# Patient Record
Sex: MALE | Race: WHITE | URBAN - METROPOLITAN AREA
[De-identification: names, ages, dates, MRNs, and addresses within clinical notes are randomized per-mention and may not be internally consistent; named-entity substitution may affect disease eponyms.]

---

## 2017-01-24 ENCOUNTER — HISTORICAL (OUTPATIENT)
Dept: RADIOLOGY | Facility: HOSPITAL | Age: 72
End: 2017-01-24

## 2017-01-30 ENCOUNTER — HISTORICAL (OUTPATIENT)
Dept: LAB | Facility: HOSPITAL | Age: 72
End: 2017-01-30

## 2017-01-30 ENCOUNTER — HISTORICAL (OUTPATIENT)
Dept: PREADMISSION TESTING | Facility: HOSPITAL | Age: 72
End: 2017-01-30

## 2017-01-31 ENCOUNTER — HISTORICAL (OUTPATIENT)
Dept: SURGERY | Facility: HOSPITAL | Age: 72
End: 2017-01-31

## 2017-02-01 ENCOUNTER — HISTORICAL (OUTPATIENT)
Dept: LAB | Facility: HOSPITAL | Age: 72
End: 2017-02-01

## 2025-04-23 ENCOUNTER — HOSPITAL ENCOUNTER (OUTPATIENT)
Dept: RADIOLOGY | Facility: CLINIC | Age: 80
Discharge: HOME | End: 2025-04-23
Attending: REHABILITATION UNIT
Payer: MEDICARE

## 2025-04-23 ENCOUNTER — OFFICE VISIT (OUTPATIENT)
Dept: ORTHOPEDICS | Facility: CLINIC | Age: 80
End: 2025-04-23
Payer: MEDICARE

## 2025-04-23 VITALS
WEIGHT: 160.81 LBS | DIASTOLIC BLOOD PRESSURE: 65 MMHG | HEART RATE: 62 BPM | SYSTOLIC BLOOD PRESSURE: 119 MMHG | BODY MASS INDEX: 23.82 KG/M2 | HEIGHT: 69 IN

## 2025-04-23 DIAGNOSIS — M25.512 LEFT SHOULDER PAIN, UNSPECIFIED CHRONICITY: ICD-10-CM

## 2025-04-23 DIAGNOSIS — M25.512 LEFT SHOULDER PAIN, UNSPECIFIED CHRONICITY: Primary | ICD-10-CM

## 2025-04-23 PROCEDURE — 73030 X-RAY EXAM OF SHOULDER: CPT | Mod: LT,,, | Performed by: REHABILITATION UNIT

## 2025-04-23 RX ORDER — GLIMEPIRIDE 4 MG/1
4 TABLET ORAL
COMMUNITY

## 2025-04-23 RX ORDER — GABAPENTIN 300 MG/1
CAPSULE ORAL
COMMUNITY

## 2025-04-23 RX ORDER — ASPIRIN 81 MG/1
81 TABLET ORAL EVERY MORNING
COMMUNITY

## 2025-04-23 RX ORDER — ATORVASTATIN CALCIUM 40 MG/1
40 TABLET, FILM COATED ORAL
COMMUNITY
Start: 2025-02-27

## 2025-04-23 RX ORDER — EMPAGLIFLOZIN 25 MG/1
25 TABLET, FILM COATED ORAL
COMMUNITY
Start: 2025-01-25

## 2025-04-23 RX ORDER — ISOSORBIDE MONONITRATE 30 MG/1
30 TABLET, EXTENDED RELEASE ORAL
COMMUNITY

## 2025-04-23 RX ORDER — LIDOCAINE HYDROCHLORIDE 10 MG/ML
3 INJECTION, SOLUTION INFILTRATION; PERINEURAL
Status: DISCONTINUED | OUTPATIENT
Start: 2025-04-23 | End: 2025-04-23 | Stop reason: HOSPADM

## 2025-04-23 RX ORDER — APIXABAN 2.5 MG/1
2.5 TABLET, FILM COATED ORAL 2 TIMES DAILY
COMMUNITY
Start: 2025-03-29

## 2025-04-23 RX ORDER — LOSARTAN POTASSIUM 25 MG/1
25 TABLET ORAL EVERY MORNING
COMMUNITY

## 2025-04-23 RX ORDER — PANTOPRAZOLE SODIUM 40 MG/1
40 TABLET, DELAYED RELEASE ORAL
COMMUNITY

## 2025-04-23 RX ORDER — BETAMETHASONE SODIUM PHOSPHATE AND BETAMETHASONE ACETATE 3; 3 MG/ML; MG/ML
6 INJECTION, SUSPENSION INTRA-ARTICULAR; INTRALESIONAL; INTRAMUSCULAR; SOFT TISSUE
Status: DISCONTINUED | OUTPATIENT
Start: 2025-04-23 | End: 2025-04-23 | Stop reason: HOSPADM

## 2025-04-23 RX ORDER — AMLODIPINE BESYLATE 5 MG/1
5 TABLET ORAL
COMMUNITY

## 2025-04-23 RX ADMIN — LIDOCAINE HYDROCHLORIDE 3 ML: 10 INJECTION, SOLUTION INFILTRATION; PERINEURAL at 08:04

## 2025-04-23 RX ADMIN — BETAMETHASONE SODIUM PHOSPHATE AND BETAMETHASONE ACETATE 6 MG: 3; 3 INJECTION, SUSPENSION INTRA-ARTICULAR; INTRALESIONAL; INTRAMUSCULAR; SOFT TISSUE at 08:04

## 2025-04-23 NOTE — PROGRESS NOTES
Subjective:      Patient ID: Jose Mata is a 80 y.o. male.    Chief Complaint: Pain of the Left Shoulder (Ongoing pain for a month. Pain is constant in the shoulder. Has tried icing the shoulder which helped some of the pain. Denies any stiffness, loss of strengthen, numbness or tingling. )    HPI:   Jose Mata is a 80 y.o. male who presents today for initial evaluation of his left shoulder    History of Present Illness    CHIEF COMPLAINT:  - Left shoulder pain    HPI:  Jose presents with left shoulder pain that started about two months ago. Initially mild and intermittent, it has progressively worsened over the past month. The pain is constant, severe, and localized to the outside of the shoulder, interfering with sleep. He reports that the pain has been continuously increasing in intensity and is now affecting his sleep quality. He can move his shoulder but with constant pain.    This is his first medical visit for the shoulder issue. A family member examined the shoulder and noted some areas of concern, but no specific treatment was mentioned.    He has a history of R DR shan treated by Dr. Marcano at [facility], which is still causing problems. He denies any history of significant shoulder arthritis.    IMAGING:  - XR Left Shoulder: No significant bony abnormalities, arthritis in the acromioclavicular (AC) joint, no severe arthritis in the glenohumeral joint, good alignment of the ball and socket joint    SURGICAL HISTORY:  - Right distal radius fracture with dislocation: treated by Dr. Becerra      ROS:  Constitutional: +sleep disturbances, +nightime pain  Musculoskeletal: +limb pain, +pain with movement          Past Medical History:   Diagnosis Date    Cancer     prostate    Diabetes mellitus type I     Heart attack     2007     Past Surgical History:   Procedure Laterality Date    APPENDECTOMY      INSERTION OF PACEMAKER      2/7/23    PENILE PROSTHESIS IMPLANT       Social History[1]    Current  "Medications[2]  Review of patient's allergies indicates:  No Known Allergies    /65   Pulse 62   Ht 5' 9" (1.753 m)   Wt 72.9 kg (160 lb 12.8 oz)   BMI 23.75 kg/m²     Comprehensive review of systems completed and negative except as per HPI.        Objective:   Head: Normocephalic, without obvious abnormality, atraumatic  Eyes: conjunctivae/corneas clear. EOM's intact  Ears: normal external appearance  Nose: Nares normal. Septum midline. Mucosa normal. No drainage  Throat: normal findings: lips normal without lesions  Lungs: unlabored breathing on room air  Chest wall: symmetric chest rise  Heart: regular rate and rhythm  Pulses: 2+ and symmetric  Skin: Skin color, texture, turgor normal. No rashes or lesions  Neurologic: Grossly normal    left SHOULDER    Appearance:   normal    Cervical Spine:   Reduced motion     Tenderness:   Lateral shoulder     AROM:   , Abduction 160, ER 70, IR T10    PROM:  same    Pain:  AROM: Positive  PROM:  Negative  End ROM: Positive  Supraspinatus strength testing: Positive  External rotation strength testing: Negative  Teresita-scapular: Negative  Virtually all provacative maneuvers Negative    Strength:  Supraspinatus: intact  External rotation: intact  Teresita-scapular: intact    Provocative Maneuvers:     Rotator Cuff/Biceps/AC Joint  Neer's Sign: Positive  Hawkin's Test: Positive  Painful arc: Negative  Belly Press: Negative  Bear Hugger Test: Negative  Hornblower's Sign: Negative  Speed's Test: Positive  Yergeson's Test: Positive  Cross Arm Abduction: Positive    Pulses: Palpable radial pulse    Neurological deficits: None    The patient has a warm and well-perfused upper extremity with capillary refill less than 2 seconds. Sensation is intact to light touch in terminal nerve distributions. 5/5 ain/pin/uln. The patient has no palpable epitrochlear lymphadenopathy.      Assessment:     Imaging:   Four view radiographs of the left shoulder obtained today personally " reviewed showing no acute fractures or dislocations. AC OA.  Humeral head remains seated centrally within the glenoid.        1. Left shoulder pain, unspecified chronicity          Plan:       Orders Placed This Encounter    Large Joint Aspiration/Injection: L subacromial bursa    X-Ray Shoulder 2 or More Views Left        Imaging and exam findings discussed.     Assessment & Plan    REFERRALS:  - Referred to PT for left shoulder. [facility]     PROCEDURES:  - Right shoulder corticosteroid injection administered today.    FOLLOW UP:  - Follow up in 6-8 weeks if symptoms persist.       Avoid aggravating activities. Symptomatic management.     All questions were answered. Patient happy and in agreement with the plan.     This note was generated with the assistance of ambient listening technology. Verbal consent was obtained by the patient and accompanying visitor(s) for the recording of patient appointment to facilitate this note. I attest to having reviewed and edited the generated note for accuracy, though some syntax or spelling errors may persist. Please contact the author of this note for any clarification.              [1]   Social History  Socioeconomic History    Marital status:    Tobacco Use    Smoking status: Never    Smokeless tobacco: Never     Social Drivers of Health     Food Insecurity: No Food Insecurity (4/12/2024)    Received from [facility] and Its Subsidiaries and Affiliates    Hunger Vital Sign     Worried About Running Out of Food in the Last Year: Never true     Ran Out of Food in the Last Year: Never true   Transportation Needs: Unmet Transportation Needs (4/12/2024)    Received from [facility] and Its Subsidiaries and Affiliates    PRAPARE - Transportation     Lack of Transportation (Medical): Yes     Lack of Transportation (Non-Medical): Yes   Housing Stability: Low Risk  (4/12/2024)    [facility] from  [facility] and Its Subsidiaries and Affiliates    Housing Stability Vital Sign     Unable to Pay for Housing in the Last Year: No     Number of Places Lived in the Last Year: 1     Unstable Housing in the Last Year: No   [2]   Current Outpatient Medications:     amLODIPine (NORVASC) 5 MG tablet, Take 5 mg by mouth., Disp: , Rfl:     aspirin (ECOTRIN) 81 MG EC tablet, Take 81 mg by mouth every morning., Disp: , Rfl:     atorvastatin (LIPITOR) 40 MG tablet, Take 40 mg by mouth., Disp: , Rfl:     ELIQUIS 2.5 mg Tab, Take 2.5 mg by mouth 2 (two) times daily., Disp: , Rfl:     gabapentin (NEURONTIN) 300 MG capsule, Take by mouth., Disp: , Rfl:     glimepiride (AMARYL) 4 MG tablet, Take 4 mg by mouth., Disp: , Rfl:     isosorbide mononitrate (IMDUR) 30 MG 24 hr tablet, Take 30 mg by mouth., Disp: , Rfl:     JARDIANCE 25 mg tablet, Take 25 mg by mouth., Disp: , Rfl:     losartan (COZAAR) 25 MG tablet, Take 25 mg by mouth every morning., Disp: , Rfl:     pantoprazole (PROTONIX) 40 MG tablet, Take 40 mg by mouth., Disp: , Rfl:

## 2025-04-23 NOTE — PROCEDURES
Large Joint Aspiration/Injection: L subacromial bursa    Date/Time: 4/23/2025 8:30 AM    Performed by: Rosa Isela Lebron PA-C  Authorized by: Eben Martinez MD    Consent Done?:  Yes (Verbal)  Indications:  Arthritis and pain  Local anesthesia used?: No      Details:  Needle Size:  22 G  Ultrasonic Guidance for needle placement?: No    Approach:  Posterior  Location:  Shoulder  Site:  L subacromial bursa  Medications:  3 mL LIDOcaine HCL 10 mg/ml (1%) 10 mg/mL (1 %); 6 mg betamethasone acetate-betamethasone sodium phosphate 6 mg/mL

## 2025-04-29 ENCOUNTER — OFFICE VISIT (OUTPATIENT)
Dept: ORTHOPEDICS | Facility: CLINIC | Age: 80
End: 2025-04-29
Payer: MEDICARE

## 2025-04-29 VITALS
BODY MASS INDEX: 23.84 KG/M2 | HEIGHT: 69 IN | DIASTOLIC BLOOD PRESSURE: 70 MMHG | HEART RATE: 65 BPM | WEIGHT: 160.94 LBS | SYSTOLIC BLOOD PRESSURE: 125 MMHG

## 2025-04-29 DIAGNOSIS — M54.12 CERVICAL RADICULOPATHY: ICD-10-CM

## 2025-04-29 DIAGNOSIS — M54.2 NECK PAIN: Primary | ICD-10-CM

## 2025-04-29 DIAGNOSIS — G89.29 CHRONIC LEFT SHOULDER PAIN: ICD-10-CM

## 2025-04-29 DIAGNOSIS — M25.512 CHRONIC LEFT SHOULDER PAIN: ICD-10-CM

## 2025-04-29 PROCEDURE — 99213 OFFICE O/P EST LOW 20 MIN: CPT | Mod: ,,, | Performed by: REHABILITATION UNIT

## 2025-04-29 RX ORDER — MELOXICAM 15 MG/1
15 TABLET ORAL DAILY
Qty: 30 TABLET | Refills: 2 | Status: SHIPPED | OUTPATIENT
Start: 2025-04-29

## 2025-04-29 NOTE — PROGRESS NOTES
Subjective:      Patient ID: Jose Mata is a 80 y.o. male.    Chief Complaint: Shoulder Pain (Left shoulder pain. Given injection on 4/23/25, has been alternating tylenol and his gummies since then but pain is not relieved. Patient states there was no relief from injection. Patient states he is unable to turn his neck to the left and pain radiates into his neck from his shoulder. Patient has not started PT yet, was call yesterday to set up his first appt. )    HPI:   Jose Mata is a 80 y.o. male who presents today for f/u evaluation of his left shoulder    History of Present Illness    CHIEF COMPLAINT:  - Shoulder pain with associated neck issues.    HPI:  Jose presents for follow-up regarding a previous injection for pain. The pain initially improved for about two days post-injection but then worsened and extended into the neck region. He describes the pain as mild with neck motion. Side-to-side head movement causes pain, particularly to the right side. He notes increased pain when bending the neck in a certain direction. Sleep is affected, with him reporting significant discomfort upon waking and moving.    He has been managing pain with Tylenol, cannabis gummies, and occasionally Tramadol (borrowed from his brother). The Tramadol provided minimal relief. He reports being able to sleep well after taking medication and a sleeping pill.    He denies any stomach issues or kidney problems.    PREVIOUS TREATMENTS:  - Corticosteroid injection: Received for shoulder pain, provided minimal benefit. Pain improved initially but worsened after two days.    MEDICATIONS:  - Tylenol  - Tramadol: Provides minimal benefit  - Cannabis gummies  - Sleeping pill    SOCIAL HISTORY:  - Uses marijuana gummies  - Takes Tramadol obtained from brother      ROS:  Constitutional: +sleep disturbances, +difficulty staying asleep  Neck: +neck pain, +pain with movement         Initial HPI:  oJse presents with left shoulder pain that  "started about two months ago. Initially mild and intermittent, it has progressively worsened over the past month. The pain is constant, severe, and localized to the outside of the shoulder, interfering with sleep. He reports that the pain has been continuously increasing in intensity and is now affecting his sleep quality. He can move his shoulder but with constant pain.    This is his first medical visit for the shoulder issue. A family member examined the shoulder and noted some areas of concern, but no specific treatment was mentioned.    He has a history of R DR fx treated by Dr. Marcano at [facility], which is still causing problems. He denies any history of significant shoulder arthritis.    IMAGING:  - XR Left Shoulder: No significant bony abnormalities, arthritis in the acromioclavicular (AC) joint, no severe arthritis in the glenohumeral joint, good alignment of the ball and socket joint    SURGICAL HISTORY:  - Right distal radius fracture with dislocation: treated by Dr. Becerra      ROS:  Constitutional: +sleep disturbances, +nightime pain  Musculoskeletal: +limb pain, +pain with movement          Past Medical History:   Diagnosis Date    Cancer     prostate    Diabetes mellitus type I     Heart attack     2007     Past Surgical History:   Procedure Laterality Date    APPENDECTOMY      INSERTION OF PACEMAKER      2/7/23    PENILE PROSTHESIS IMPLANT       Social History[1]    Current Medications[2]  Review of patient's allergies indicates:  No Known Allergies    /70   Pulse 65   Ht 5' 9" (1.753 m)   Wt 73 kg (160 lb 15 oz)   BMI 23.77 kg/m²     Comprehensive review of systems completed and negative except as per HPI.        Objective:   Head: Normocephalic, without obvious abnormality, atraumatic  Eyes: conjunctivae/corneas clear. EOM's intact  Ears: normal external appearance  Nose: Nares normal. Septum midline. Mucosa normal. No drainage  Throat: normal findings: lips normal without lesions  Lungs: " unlabored breathing on room air  Chest wall: symmetric chest rise  Heart: regular rate and rhythm  Pulses: 2+ and symmetric  Skin: Skin color, texture, turgor normal. No rashes or lesions  Neurologic: Grossly normal    left SHOULDER    Appearance:   normal    Cervical Spine:   Reduced and painful motion; + Spurlings     Tenderness:   Lateral shoulder     AROM:   , Abduction 160, ER 70, IR T10    PROM:  same    Pain:  AROM: Positive  PROM:  Negative  End ROM: Positive  Supraspinatus strength testing: Positive  External rotation strength testing: Negative  Teresita-scapular: Negative  Virtually all provacative maneuvers Negative    Strength:  Supraspinatus: intact  External rotation: intact  Teresita-scapular: intact    Provocative Maneuvers:     Rotator Cuff/Biceps/AC Joint  Neer's Sign: Positive  Hawkin's Test: Positive  Painful arc: Negative  Belly Press: Negative  Bear Hugger Test: Negative  Hornblower's Sign: Negative  Speed's Test: Positive  Yergeson's Test: Positive  Cross Arm Abduction: Positive    Pulses: Palpable radial pulse    Neurological deficits: None    The patient has a warm and well-perfused upper extremity with capillary refill less than 2 seconds. Sensation is intact to light touch in terminal nerve distributions. 5/5 ain/pin/uln. The patient has no palpable epitrochlear lymphadenopathy.      Assessment:     Imaging:   Four view radiographs of the left shoulder show no acute fractures or dislocations. AC OA.  Humeral head remains seated centrally within the glenoid.        1. Neck pain    2. Cervical radiculopathy    3. Chronic left shoulder pain            Plan:       Orders Placed This Encounter    Ambulatory referral/consult to Pain Clinic    meloxicam (MOBIC) 15 MG tablet       Imaging and exam findings discussed.      Assessment & Plan    MEDICATIONS:  - prescription for Mobic    REFERRALS:  - Referred to pain management for neck pain.    Start physical therapy    FOLLOW UP:  - Follow up in 6  weeks if symptoms do not improve.  MRI if he has persistent issues.        Avoid aggravating activities. Symptomatic management.     All questions were answered. Patient happy and in agreement with the plan.     This note was generated with the assistance of ambient listening technology. Verbal consent was obtained by the patient and accompanying visitor(s) for the recording of patient appointment to facilitate this note. I attest to having reviewed and edited the generated note for accuracy, though some syntax or spelling errors may persist. Please contact the author of this note for any clarification.                [1]   Social History  Socioeconomic History    Marital status:    Tobacco Use    Smoking status: Never    Smokeless tobacco: Never     Social Drivers of Health     Food Insecurity: No Food Insecurity (4/12/2024)    Received from [facility] and Its Subsidiaries and Affiliates    Hunger Vital Sign     Worried About Running Out of Food in the Last Year: Never true     Ran Out of Food in the Last Year: Never true   Transportation Needs: Unmet Transportation Needs (4/12/2024)    Received from [facility] and Its Subsidiaries and Affiliates    PRAPARE - Transportation     Lack of Transportation (Medical): Yes     Lack of Transportation (Non-Medical): Yes   Housing Stability: Low Risk  (4/12/2024)    Received from [facility] and Its Subsidiaries and Affiliates    Housing Stability Vital Sign     Unable to Pay for Housing in the Last Year: No     Number of Places Lived in the Last Year: 1     Unstable Housing in the Last Year: No   [2]   Current Outpatient Medications:     amLODIPine (NORVASC) 5 MG tablet, Take 5 mg by mouth., Disp: , Rfl:     aspirin (ECOTRIN) 81 MG EC tablet, Take 81 mg by mouth every morning., Disp: , Rfl:     atorvastatin (LIPITOR) 40 MG tablet, Take 40 mg by mouth.,  Disp: , Rfl:     ELIQUIS 2.5 mg Tab, Take 2.5 mg by mouth 2 (two) times daily., Disp: , Rfl:     gabapentin (NEURONTIN) 300 MG capsule, Take by mouth., Disp: , Rfl:     glimepiride (AMARYL) 4 MG tablet, Take 4 mg by mouth., Disp: , Rfl:     isosorbide mononitrate (IMDUR) 30 MG 24 hr tablet, Take 30 mg by mouth., Disp: , Rfl:     JARDIANCE 25 mg tablet, Take 25 mg by mouth., Disp: , Rfl:     losartan (COZAAR) 25 MG tablet, Take 25 mg by mouth every morning., Disp: , Rfl:     pantoprazole (PROTONIX) 40 MG tablet, Take 40 mg by mouth., Disp: , Rfl:     meloxicam (MOBIC) 15 MG tablet, Take 1 tablet (15 mg total) by mouth once daily., Disp: 30 tablet, Rfl: 2